# Patient Record
Sex: FEMALE | Race: WHITE | NOT HISPANIC OR LATINO | URBAN - METROPOLITAN AREA
[De-identification: names, ages, dates, MRNs, and addresses within clinical notes are randomized per-mention and may not be internally consistent; named-entity substitution may affect disease eponyms.]

---

## 2017-07-13 ENCOUNTER — EMERGENCY (EMERGENCY)
Facility: HOSPITAL | Age: 62
LOS: 1 days | Discharge: HOME | End: 2017-07-13
Attending: EMERGENCY MEDICINE | Admitting: EMERGENCY MEDICINE
Payer: COMMERCIAL

## 2017-07-13 VITALS
TEMPERATURE: 98 F | SYSTOLIC BLOOD PRESSURE: 99 MMHG | RESPIRATION RATE: 16 BRPM | HEART RATE: 79 BPM | DIASTOLIC BLOOD PRESSURE: 68 MMHG | OXYGEN SATURATION: 99 %

## 2017-07-13 PROCEDURE — 73120 X-RAY EXAM OF HAND: CPT | Mod: 26,RT

## 2017-07-13 PROCEDURE — 99284 EMERGENCY DEPT VISIT MOD MDM: CPT

## 2017-07-13 RX ORDER — IBUPROFEN 200 MG
600 TABLET ORAL ONCE
Qty: 0 | Refills: 0 | Status: COMPLETED | OUTPATIENT
Start: 2017-07-13 | End: 2017-07-13

## 2017-07-13 RX ADMIN — Medication 600 MILLIGRAM(S): at 13:08
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
## 2017-07-13 NOTE — ED PROVIDER NOTE - UPPER EXTREMITY EXAM, RIGHT
Tender to palpation overlying second metacarpal bone. Non tender to base of thumb. Non tender to axial loading

## 2017-07-13 NOTE — ED PROVIDER NOTE - CARE PLAN
Principal Discharge DX:	Hand pain Principal Discharge DX:	Hand pain  Instructions for follow-up, activity and diet:	PLEASE CONTINUE APPLYING ICE TO THE AREA MULTIPLE TIMES PER DAY, YOU MAY TAKE OTC IBUPROFEN 600MG EVERY 8HRS AS NEEDED FOR THE PAIN. IF THE PAIN PERSISTS FOR OVER 1 WEEK, PLEASE SCHEDULE AN APPOINTMENT TO SEE HAND SPECIALIST. RETURN TO ER FOR WORSENING PAIN, SENSATION LOSS, BLUE DISCOLORATION OF FINGERS. Principal Discharge DX:	Fracture of metacarpal bone  Instructions for follow-up, activity and diet:	PLEASE CONTINUE APPLYING ICE TO THE AREA MULTIPLE TIMES PER DAY, YOU MAY TAKE OTC IBUPROFEN 600MG EVERY 8HRS AS NEEDED FOR THE PAIN. WEAR SPLINT AT ALL TIMES AND TRY TO KEEP IT DRY; SCHEDULE AN APPOINTMENT TO SEE HAND SPECIALIST DR SILVER. RETURN TO ER FOR WORSENING PAIN, SENSATION LOSS, BLUE DISCOLORATION OF FINGERS.
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
## 2017-07-13 NOTE — ED PROVIDER NOTE - PROGRESS NOTE DETAILS
MARIA INES Silvestre RW - received pt s/p Quids eval by Dr Springer - s/p hand injury, xrays pending, if neg will ace wrap and dc with RICE instructions, +/- hand f/u. PA Groszek - volar splint applied, hand f/u given, pt with low bp, states at baseline (very petite person).
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
## 2017-07-13 NOTE — ED PROVIDER NOTE - MEDICAL DECISION MAKING DETAILS
Will get XR to r/o fx. Low suspicion of scaphoid fx as pt has no pain with axial loading and no tenderness at base of thumb

## 2017-07-13 NOTE — ED PROVIDER NOTE - PLAN OF CARE
PLEASE CONTINUE APPLYING ICE TO THE AREA MULTIPLE TIMES PER DAY, YOU MAY TAKE OTC IBUPROFEN 600MG EVERY 8HRS AS NEEDED FOR THE PAIN. IF THE PAIN PERSISTS FOR OVER 1 WEEK, PLEASE SCHEDULE AN APPOINTMENT TO SEE HAND SPECIALIST. RETURN TO ER FOR WORSENING PAIN, SENSATION LOSS, BLUE DISCOLORATION OF FINGERS. PLEASE CONTINUE APPLYING ICE TO THE AREA MULTIPLE TIMES PER DAY, YOU MAY TAKE OTC IBUPROFEN 600MG EVERY 8HRS AS NEEDED FOR THE PAIN. WEAR SPLINT AT ALL TIMES AND TRY TO KEEP IT DRY; SCHEDULE AN APPOINTMENT TO SEE HAND SPECIALIST DR SILVER. RETURN TO ER FOR WORSENING PAIN, SENSATION LOSS, BLUE DISCOLORATION OF FINGERS.

## 2017-09-19 PROBLEM — Z00.00 ENCOUNTER FOR PREVENTIVE HEALTH EXAMINATION: Status: ACTIVE | Noted: 2017-09-19

## 2017-09-20 ENCOUNTER — APPOINTMENT (OUTPATIENT)
Dept: VASCULAR SURGERY | Facility: CLINIC | Age: 62
End: 2017-09-20
Payer: COMMERCIAL

## 2017-09-20 VITALS
WEIGHT: 120 LBS | SYSTOLIC BLOOD PRESSURE: 110 MMHG | HEART RATE: 85 BPM | HEIGHT: 62 IN | OXYGEN SATURATION: 100 % | BODY MASS INDEX: 22.08 KG/M2 | DIASTOLIC BLOOD PRESSURE: 72 MMHG

## 2017-09-20 DIAGNOSIS — F17.200 NICOTINE DEPENDENCE, UNSPECIFIED, UNCOMPLICATED: ICD-10-CM

## 2017-09-20 PROCEDURE — 93971 EXTREMITY STUDY: CPT

## 2017-09-20 PROCEDURE — 99244 OFF/OP CNSLTJ NEW/EST MOD 40: CPT | Mod: 25

## 2017-11-22 ENCOUNTER — APPOINTMENT (OUTPATIENT)
Dept: VASCULAR SURGERY | Facility: CLINIC | Age: 62
End: 2017-11-22
Payer: COMMERCIAL

## 2017-11-22 PROCEDURE — 37765 STAB PHLEB VEINS XTR 10-20: CPT | Mod: LT

## 2017-11-30 ENCOUNTER — APPOINTMENT (OUTPATIENT)
Dept: VASCULAR SURGERY | Facility: CLINIC | Age: 62
End: 2017-11-30
Payer: COMMERCIAL

## 2017-11-30 DIAGNOSIS — I83.899 VARICOSE VEINS OF UNSPECIFIED LOWER EXTREMITY WITH OTHER COMPLICATIONS: ICD-10-CM

## 2017-11-30 PROCEDURE — 99024 POSTOP FOLLOW-UP VISIT: CPT

## 2017-12-04 VITALS
SYSTOLIC BLOOD PRESSURE: 110 MMHG | DIASTOLIC BLOOD PRESSURE: 70 MMHG | OXYGEN SATURATION: 99 % | HEART RATE: 80 BPM | RESPIRATION RATE: 18 BRPM

## 2017-12-04 PROBLEM — I83.899 SYMPTOMATIC VARICOSE VEINS, UNSPECIFIED LATERALITY: Status: ACTIVE | Noted: 2017-09-20

## 2017-12-04 RX ORDER — DIETHYLPROPION HYDROCHLORIDE 75 MG/1
75 TABLET, EXTENDED RELEASE ORAL
Qty: 30 | Refills: 0 | Status: ACTIVE | COMMUNITY
Start: 2017-01-06

## 2021-05-05 NOTE — ED ADULT NURSE NOTE - PRO INTERPRETER NEED 2
English Interpolation Flap Text: A decision was made to reconstruct the defect utilizing an interpolation axial flap and a staged reconstruction.  A telfa template was made of the defect.  This telfa template was then used to outline the interpolation flap.  The donor area for the pedicle flap was then injected with anesthesia.  The flap was excised through the skin and subcutaneous tissue down to the layer of the underlying musculature.  The interpolation flap was carefully excised within this deep plane to maintain its blood supply.  The edges of the donor site were undermined.   The donor site was closed in a primary fashion.  The pedicle was then rotated into position and sutured.  Once the tube was sutured into place, adequate blood supply was confirmed with blanching and refill.  The pedicle was then wrapped with xeroform gauze and dressed appropriately with a telfa and gauze bandage to ensure continued blood supply and protect the attached pedicle.